# Patient Record
Sex: FEMALE | Race: WHITE
[De-identification: names, ages, dates, MRNs, and addresses within clinical notes are randomized per-mention and may not be internally consistent; named-entity substitution may affect disease eponyms.]

---

## 2018-06-18 ENCOUNTER — HOSPITAL ENCOUNTER (EMERGENCY)
Dept: HOSPITAL 31 - C.ER | Age: 58
LOS: 1 days | Discharge: HOME | End: 2018-06-19
Payer: COMMERCIAL

## 2018-06-18 VITALS — OXYGEN SATURATION: 98 %

## 2018-06-18 DIAGNOSIS — R10.13: Primary | ICD-10-CM

## 2018-06-18 LAB
ALBUMIN SERPL-MCNC: 4.2 G/DL (ref 3.5–5)
ALBUMIN/GLOB SERPL: 1.3 {RATIO} (ref 1–2.1)
ALT SERPL-CCNC: 26 U/L (ref 9–52)
AST SERPL-CCNC: 27 U/L (ref 14–36)
BASOPHILS # BLD AUTO: 0 K/UL (ref 0–0.2)
BASOPHILS NFR BLD: 0.7 % (ref 0–2)
BUN SERPL-MCNC: 14 MG/DL (ref 7–17)
CALCIUM SERPL-MCNC: 9.1 MG/DL (ref 8.6–10.4)
EOSINOPHIL # BLD AUTO: 0.3 K/UL (ref 0–0.7)
EOSINOPHIL NFR BLD: 4.3 % (ref 0–4)
ERYTHROCYTE [DISTWIDTH] IN BLOOD BY AUTOMATED COUNT: 13.1 % (ref 11.5–14.5)
GFR NON-AFRICAN AMERICAN: 58
HGB BLD-MCNC: 13 G/DL (ref 11–16)
LIPASE: 69 U/L (ref 23–300)
LYMPHOCYTES # BLD AUTO: 2 K/UL (ref 1–4.3)
LYMPHOCYTES NFR BLD AUTO: 29.6 % (ref 20–40)
MCH RBC QN AUTO: 29.3 PG (ref 27–31)
MCHC RBC AUTO-ENTMCNC: 32.8 G/DL (ref 33–37)
MCV RBC AUTO: 89.3 FL (ref 81–99)
MONOCYTES # BLD: 0.5 K/UL (ref 0–0.8)
MONOCYTES NFR BLD: 6.9 % (ref 0–10)
NEUTROPHILS # BLD: 3.9 K/UL (ref 1.8–7)
NEUTROPHILS NFR BLD AUTO: 58.5 % (ref 50–75)
NRBC BLD AUTO-RTO: 0.2 % (ref 0–2)
PLATELET # BLD: 285 K/UL (ref 130–400)
PMV BLD AUTO: 8.9 FL (ref 7.2–11.7)
RBC # BLD AUTO: 4.44 MIL/UL (ref 3.8–5.2)
WBC # BLD AUTO: 6.6 K/UL (ref 4.8–10.8)

## 2018-06-18 PROCEDURE — 80053 COMPREHEN METABOLIC PANEL: CPT

## 2018-06-18 PROCEDURE — 74177 CT ABD & PELVIS W/CONTRAST: CPT

## 2018-06-18 PROCEDURE — 99285 EMERGENCY DEPT VISIT HI MDM: CPT

## 2018-06-18 PROCEDURE — 84484 ASSAY OF TROPONIN QUANT: CPT

## 2018-06-18 PROCEDURE — 93005 ELECTROCARDIOGRAM TRACING: CPT

## 2018-06-18 PROCEDURE — 71045 X-RAY EXAM CHEST 1 VIEW: CPT

## 2018-06-18 PROCEDURE — 85025 COMPLETE CBC W/AUTO DIFF WBC: CPT

## 2018-06-18 PROCEDURE — 83690 ASSAY OF LIPASE: CPT

## 2018-06-18 NOTE — C.PDOC
History Of Present Illness


58 y/o female presents to the ED complaining of epigastric pain for the past 

several days. Associated with mild nausea, but no vomiting. Patient reports a 

history of ulcers confirmed by endoscopy, unknown when last study was. 

Otherwise denies any diarrhea, fever, dysuria, or change in appetite. 





Time Seen by Provider: 18 21:25


Chief Complaint (Nursing): Abdominal Pain


History Per: Patient


History/Exam Limitations: no limitations


Onset/Duration Of Symptoms: Days


Current Symptoms Are (Timing): Still Present


Location Of Pain/Discomfort: Epigastric





Past Medical History


Reviewed: Historical Data, Nursing Documentation, Vital Signs


Vital Signs: 


 Last Vital Signs











Temp  97.9 F   18 00:22


 


Pulse  65   18 00:22


 


Resp  18   18 00:22


 


BP  118/77   18 00:22


 


Pulse Ox  98   18 00:22














- Medical History


PMH: Gastritis


Surgical History: Tonsillectomy


Family History: States: No Known Family Hx





- Social History


Hx Alcohol Use: Yes


Hx Substance Use: No





- Immunization History


Hx Tetanus Toxoid Vaccination: Yes


Hx Influenza Vaccination: No


Hx Pneumococcal Vaccination: No





Review Of Systems


Except As Marked, All Systems Reviewed And Found Negative.


Constitutional: Negative for: Fever, Chills


Gastrointestinal: Positive for: Nausea, Abdominal Pain.  Negative for: Vomiting

, Diarrhea


Genitourinary: Negative for: Dysuria





Physical Exam





- Physical Exam


Appears: Non-toxic, No Acute Distress


Skin: Normal Color, Warm, Dry


Head: Atraumatic, Normacephalic


Eye(s): bilateral: Normal Inspection, PERRL, EOMI


Oral Mucosa: Moist


Neck: Normal ROM, Supple


Chest: Symmetrical


Cardiovascular: Rhythm Regular, No Murmur


Respiratory: Normal Breath Sounds, No Rales, No Rhonchi, No Wheezing


Gastrointestinal/Abdominal: Soft, Tenderness (mild epigastric tenderness), No 

Guarding, No Rebound


Back: Normal Inspection, No CVA Tenderness


Extremity: Bilateral: Atraumatic, Normal Color And Temperature, Normal ROM


Pulses: Left Dorsalis Pedis: Normal, Right Dorsalis Pedis: Normal


Neurological/Psych: Oriented x3, Normal Speech


Gait: Steady





ED Course And Treatment





- Laboratory Results


Result Diagrams: 


 18 21:48





 18 21:48


Lab Interpretation: Normal


ECG: Interpreted By Me, Viewed By Me


ECG Rhythm: Sinus Rhythm


ECG Interpretation: Normal


Interpretation Of ECG: Normal axis, normal intervals


Rate From EC


O2 Sat by Pulse Oximetry: 98 (RA)


Pulse Ox Interpretation: Normal





- CT Scan/US


  ** CT A/P


Other Rad Studies (CT/US): Read By Radiologist, Radiology Report Reviewed


CT/US Interpretation: Name: RODRICK BURDICK Age: 57Years F Date: 2018.  MRN: [id number] SSN:  : 1960.  Study: CT ABDOMEN/

PELVIS W Requesting Physician: Rajesh Dobson.  Accession: [id number] 

Images: 639.  Addl Studies:  Provided Clinical History: epigastric and LUQ 

tenderness.  CONFIDENTIALITY STATEMENT.  This transmission is confidential and 

is intended to be a privileged communication. It is intended only for the use 

of the addressee. Access to this.  message by anyone else is unauthorized. If 

you are not the intended recipient, any disclosure, copying, distribution or 

any action taken, or omitted to.  be taken in reliance on it is prohibited and 

may be unlawful. If you received this communication in error, please notify us 

by telephone, so that return.  of this document to us can be arranged.  Page 1 

of 3.  EXAM:  CT Abdomen and Pelvis With Intravenous Contrast.  EXAM DATE/TIME:

  Exam ordered 2018 9:33 PM.  CLINICAL HISTORY:  57 years old, female; Pain

; Abdominal pain; Localized; Upper; Additional info: Epigastric and luq.  

tenderness.  TECHNIQUE:  Axial computed tomography images of the abdomen and 

pelvis with intravenous contrast. All CT.  scans at this facility use one or 

more dose reduction techniques, viz.: automated exposure control;.  ma/kV 

adjustment per patient size (including targeted exams where dose is matched to 

indication; i.e.  head); or iterative reconstruction technique.  Coronal and 

sagittal reformatted images were created and reviewed.  CONTRAST:  100 mL of 

visipaque 320 administered intravenously.  COMPARISON:  No relevant prior 

studies available.  FINDINGS:  Lung bases: The right hemidiaphragm is elevated. 

Segmental atelectasis is noted in the right lower.  lobe.  ABDOMEN:  Liver: 

Unremarkable. No mass.  Gallbladder and bile ducts: Unremarkable. No calcified 

stones. No ductal dilation.  Pancreas: Unremarkable. No mass. No ductal 

dilation.  Spleen: an 8mm low density lesion is noted within the spleen. There 

is some suggestion of.  peripheral enhancement. It has a density measurement of 

80 H.  Adrenals: Unremarkable. No mass.  Kidneys and ureters: Unremarkable. No 

solid mass. No hydronephrosis.  Stomach and bowel: A curvilinear radiopaque 

structure seen on images 93-99 (series 3) may be.  related to something the 

patient has ingested or represent suture material within the bowel. Clinical.  

correlation suggested.  PELVIS:  Appendix: No findings to suggest acute 

appendicitis.  Bladder: Unremarkable. No mass.  Reproductive: Calcifications 

within the uterus suggests fibroids.  ABDOMEN and PELVIS:  Intraperitoneal space

: Unremarkable. No free air. No significant fluid collection.  Bones/joints: 

Sclerosis noted on both sides of the left sacroiliac joint. The joint is on 

fused.  Degenerative changes are noted of the facet joints at the lumbosacral 

junction.. No dislocation.  Soft tissues: Calcified granulomas noted within the 

subcutaneous fat overlying the right buttock.  .There is a somewhat 

heterogeneous predominantly fat containing mass noted interposed between.  the 

liver inferiorly and the right hemidiaphragm superiorly. Heterogeneous soft 

tissue component is.  noted within the mass. The entire mass measures 

approximately 5.7 x 9.6 x 14.2 cm. Coarse.  calcification is noted within the 

mass.  Vasculature: Unremarkable. No abdominal aortic aneurysm.  Lymph nodes: 

Unremarkable. No enlarged lymph nodes.  IMPRESSION.  1. Mesenchymal mass 

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  

Differential diagnostic considerations include liposarcoma.  2. Subcentimeter 

low density lesion noted within the spleen. Differential diagnostic 

considerations.  include hemangioma. In the absence of disease within the liver

, metastatic disease would be unusual.  3. Curvilinear structure noted within 

or on this small bowel loop could represent suture material from.  previous 

surgery. Clinical correlation needed to exclude an ingested foreign body.  4. 

Fibroids.  Thank you for allowing us to participate in the care of your 

patient.  Dictated and Authenticated by: Bertha Thomas MD.  2018 11:

42 PM Eastern Time ([place] & [place])





Medical Decision Making


Medical Decision Making: 


Initial Impression: 58 y/o female with epigastric pain





Time: 22:36


Initial Plan:


--CMP


--Lipase


--Troponin I


--CBC


--Chest X-ray


--CT A/P with IV contrast


--IV fluids


--Maalox 30 ml PO


--Donnatal 1 tab PO


--Reevaluation 





Labs reviewed, and are unremarkable. Trop negative. Lipase is wnl. 








Disposition





- Disposition


Referrals: 


[facility] [Outside]


[facility] [Outside]


Disposition: HOME/ ROUTINE


Disposition Time: 23:35


Condition: GOOD


Additional Instructions: 





RODRICK BURDICK, thank you for letting us take care of you today. Your 

provider was Rajesh Dobson DO and you were treated for STOMACH PAINS. The 

emergency medical care you received today was directed at your acute symptoms. 

If you were prescribed any medication, please fill it and take as directed. It 

may take several days for your symptoms to resolve. Return to the Emergency 

Department if your symptoms worsen, do not improve, or if you have any other 

problems.





Please contact your doctor or call one of the physicians/clinics you have been 

referred to that are listed on the Patient Visit Information form that is 

included in your discharge packet. Bring any paperwork you were given at 

discharge with you along with any medications you are taking to your follow up 

visit. Our treatment cannot replace ongoing medical care by a primary care 

provider outside of the emergency department.





Thank you for allowing the [facility] team to be part of your care today.














Follow up in the clinic this week for outpatient care and re-evaluation.


Prescriptions: 


Famotidine [Pepcid] 40 mg PO DAILY #14 tab


Instructions:  Gastritis


Forms:  [employer] (English), Work Excuse





- Clinical Impression


Clinical Impression: 


 Abdominal pain








- Scribe Statement


The provider has reviewed the documentation as recorded by the Scribe (Corine Castro)


Provider Attestation: 


All medical record entries made by the Scribe were at my direction and 

personally dictated by me. I have reviewed the chart and agree that the record 

accurately reflects my personal performance of the history, physical exam, 

medical decision making, and the department course for this patient. I have 

also personally directed, reviewed, and agree with the discharge instructions 

and disposition.

## 2018-06-18 NOTE — CT
EXAM:

  CT Abdomen and Pelvis With Intravenous Contrast



EXAM DATE/TIME:

  Exam ordered 6/18/2018 9:33 PM



CLINICAL HISTORY:

  57 years old, female; Pain; Abdominal pain; Localized; Upper; Additional 

info: Epigastric and luq tenderness



TECHNIQUE:

  Axial computed tomography images of the abdomen and pelvis with intravenous 

contrast.  All CT scans at this facility use one or more dose reduction 

techniques, viz.: automated exposure control; ma/kV adjustment per patient size 

(including targeted exams where dose is matched to indication; i.e. head); or 

iterative reconstruction technique.

  Coronal and sagittal reformatted images were created and reviewed.



CONTRAST:

  100 mL of visipaque 320 administered intravenously.



COMPARISON:

  No relevant prior studies available.



FINDINGS:

  Lung bases:  The right hemidiaphragm is elevated. Segmental atelectasis is 

noted in the right lower lobe.



 ABDOMEN:

  Liver:  Unremarkable.  No mass.

  Gallbladder and bile ducts:  Unremarkable.  No calcified stones.  No ductal 

dilation.

  Pancreas:  Unremarkable.  No mass.  No ductal dilation.

  Spleen:  an 8mm low density lesion is noted within the spleen. There is some 

suggestion of peripheral enhancement. It has a density measurement of 80 H.  

  Adrenals:  Unremarkable.  No mass.

  Kidneys and ureters:  Unremarkable.  No solid mass.  No hydronephrosis.

  Stomach and bowel:  A curvilinear radiopaque structure seen on images 93-99 

(series 3) may be related to something the patient has ingested or represent 

suture material within the bowel. Clinical correlation suggested.



 PELVIS:

  Appendix:  No findings to suggest acute appendicitis.

  Bladder:  Unremarkable.  No mass.

  Reproductive:  Calcifications within the uterus suggests fibroids.



 ABDOMEN and PELVIS:

  Intraperitoneal space:  Unremarkable.  No free air.  No significant fluid 

collection.

  Bones/joints: Sclerosis noted on both sides of the left sacroiliac joint. The 

joint is on fused. Degenerative changes are noted of the facet joints at the 

lumbosacral junction..  No dislocation.

  Soft tissues:  Calcified granulomas noted within the subcutaneous fat 

overlying the right buttock .There is a somewhat heterogeneous predominantly 

fat containing mass noted interposed between the liver inferiorly and the right 

hemidiaphragm superiorly. Heterogeneous soft tissue component is noted within 

the mass. The entire mass measures approximately 5.7 x 9.6 x 14.2 cm. Coarse 

calcification is noted within the mass. 

Vasculature:  Unremarkable.  No abdominal aortic aneurysm.

  Lymph nodes:  Unremarkable.  No enlarged lymph nodes.



IMPRESSION 

1. 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 

the liver. Differential diagnostic considerations include liposarcoma.



2. Subcentimeter low density lesion noted within the spleen. Differential 

diagnostic considerations include hemangioma. In the absence of disease within 

the liver, metastatic disease would be unusual.



3. Curvilinear structure noted within or on this small bowel loop could 

represent suture material from previous surgery. Clinical correlation needed to 

exclude an ingested foreign body.



4. Fibroids

## 2018-06-19 VITALS
RESPIRATION RATE: 18 BRPM | TEMPERATURE: 97.9 F | SYSTOLIC BLOOD PRESSURE: 118 MMHG | DIASTOLIC BLOOD PRESSURE: 77 MMHG | HEART RATE: 65 BPM

## 2018-06-19 NOTE — RAD
HISTORY:

r/o infiltrate  



COMPARISON:

CT abdomen and pelvis IV contrast 6/18/2018 



FINDINGS:



LUNGS:

The low-density the right lung base chest CT had slurred units 

compatible with fat. This is approximately 1/3 the height of the 

right hemithorax. A large right pleural lipoma is a consideration. 

Prior of the right hemidiaphragm appears intact on this exam.



PLEURA:

No significant pleural effusion identified, no pneumothorax apparent. 

Large right pleural lipoma is a consideration. 



CARDIOVASCULAR:

Normal.



OSSEOUS STRUCTURES:

No significant abnormalities.



VISUALIZED UPPER ABDOMEN:

Normal.



OTHER FINDINGS:

None.



IMPRESSION:

Large fatty like density at the right lung base.  Given the 

accompanying CT study of the abdomen and pelvis which includes the 

right lung base, a large right pleural lipoma needs to be considered 

. Trace associated compressive discoid atelectasis associated with  

it.

## 2018-06-21 NOTE — CARD
--------------- APPROVED REPORT --------------





EKG Measurement

Heart [id number]

FL [id number]

[id number]

[id number]

[id number]



<Conclusion>

Normal sinus rhythm

Normal ECG